# Patient Record
Sex: FEMALE | Race: WHITE | NOT HISPANIC OR LATINO | Employment: FULL TIME | URBAN - METROPOLITAN AREA
[De-identification: names, ages, dates, MRNs, and addresses within clinical notes are randomized per-mention and may not be internally consistent; named-entity substitution may affect disease eponyms.]

---

## 2017-01-24 ENCOUNTER — GENERIC CONVERSION - ENCOUNTER (OUTPATIENT)
Dept: OTHER | Facility: OTHER | Age: 57
End: 2017-01-24

## 2017-02-15 ENCOUNTER — TRANSCRIBE ORDERS (OUTPATIENT)
Dept: LAB | Facility: CLINIC | Age: 57
End: 2017-02-15

## 2017-02-15 ENCOUNTER — APPOINTMENT (OUTPATIENT)
Dept: LAB | Facility: CLINIC | Age: 57
End: 2017-02-15
Payer: COMMERCIAL

## 2017-02-15 DIAGNOSIS — N93.8 DYSFUNCTIONAL UTERINE BLEEDING: Primary | ICD-10-CM

## 2017-02-15 LAB — VENIPUNCTURE: NORMAL

## 2017-02-15 PROCEDURE — 36415 COLL VENOUS BLD VENIPUNCTURE: CPT

## 2017-04-18 ENCOUNTER — APPOINTMENT (OUTPATIENT)
Dept: LAB | Facility: CLINIC | Age: 57
End: 2017-04-18
Payer: COMMERCIAL

## 2017-04-18 ENCOUNTER — TRANSCRIBE ORDERS (OUTPATIENT)
Dept: LAB | Facility: CLINIC | Age: 57
End: 2017-04-18

## 2017-04-18 DIAGNOSIS — Z13.6 SCREENING FOR ISCHEMIC HEART DISEASE: Primary | ICD-10-CM

## 2017-04-18 LAB
BASOPHILS # BLD AUTO: 0.03 THOUSANDS/ΜL (ref 0–0.1)
BASOPHILS NFR BLD AUTO: 0 % (ref 0–1)
CREAT SERPL-MCNC: 0.52 MG/DL (ref 0.6–1.3)
EOSINOPHIL # BLD AUTO: 0.33 THOUSAND/ΜL (ref 0–0.61)
EOSINOPHIL NFR BLD AUTO: 5 % (ref 0–6)
ERYTHROCYTE [DISTWIDTH] IN BLOOD BY AUTOMATED COUNT: 13 % (ref 11.6–15.1)
GFR SERPL CREATININE-BSD FRML MDRD: >60 ML/MIN/1.73SQ M
HCT VFR BLD AUTO: 36.1 % (ref 34.8–46.1)
HGB BLD-MCNC: 11.7 G/DL (ref 11.5–15.4)
LYMPHOCYTES # BLD AUTO: 2.06 THOUSANDS/ΜL (ref 0.6–4.47)
LYMPHOCYTES NFR BLD AUTO: 29 % (ref 14–44)
MCH RBC QN AUTO: 29.6 PG (ref 26.8–34.3)
MCHC RBC AUTO-ENTMCNC: 32.4 G/DL (ref 31.4–37.4)
MCV RBC AUTO: 91 FL (ref 82–98)
MONOCYTES # BLD AUTO: 0.3 THOUSAND/ΜL (ref 0.17–1.22)
MONOCYTES NFR BLD AUTO: 4 % (ref 4–12)
NEUTROPHILS # BLD AUTO: 4.46 THOUSANDS/ΜL (ref 1.85–7.62)
NEUTS SEG NFR BLD AUTO: 62 % (ref 43–75)
NRBC BLD AUTO-RTO: 0 /100 WBCS
PLATELET # BLD AUTO: 266 THOUSANDS/UL (ref 149–390)
PMV BLD AUTO: 12 FL (ref 8.9–12.7)
POTASSIUM SERPL-SCNC: 3.8 MMOL/L (ref 3.5–5.3)
RBC # BLD AUTO: 3.95 MILLION/UL (ref 3.81–5.12)
WBC # BLD AUTO: 7.18 THOUSAND/UL (ref 4.31–10.16)

## 2017-04-18 PROCEDURE — 85025 COMPLETE CBC W/AUTO DIFF WBC: CPT

## 2017-04-18 PROCEDURE — 84132 ASSAY OF SERUM POTASSIUM: CPT

## 2017-04-18 PROCEDURE — 36415 COLL VENOUS BLD VENIPUNCTURE: CPT

## 2017-04-18 PROCEDURE — 82565 ASSAY OF CREATININE: CPT

## 2017-05-23 ENCOUNTER — TRANSCRIBE ORDERS (OUTPATIENT)
Dept: ADMINISTRATIVE | Facility: HOSPITAL | Age: 57
End: 2017-05-23

## 2017-05-23 ENCOUNTER — LAB (OUTPATIENT)
Dept: LAB | Facility: HOSPITAL | Age: 57
End: 2017-05-23
Payer: COMMERCIAL

## 2017-05-23 DIAGNOSIS — R50.82 FEVER POSTOP: Primary | ICD-10-CM

## 2017-05-23 DIAGNOSIS — R50.82 FEVER POSTOP: ICD-10-CM

## 2017-05-23 LAB
BACTERIA UR QL AUTO: ABNORMAL /HPF
BASOPHILS # BLD AUTO: 0 THOUSANDS/ΜL (ref 0–0.1)
BASOPHILS NFR BLD AUTO: 0 % (ref 0–1)
BILIRUB UR QL STRIP: NEGATIVE
CLARITY UR: ABNORMAL
COLOR UR: YELLOW
EOSINOPHIL # BLD AUTO: 0.4 THOUSAND/ΜL (ref 0–0.61)
EOSINOPHIL NFR BLD AUTO: 3 % (ref 0–6)
ERYTHROCYTE [DISTWIDTH] IN BLOOD BY AUTOMATED COUNT: 13.2 % (ref 11.6–15.1)
GLUCOSE UR STRIP-MCNC: NEGATIVE MG/DL
HCT VFR BLD AUTO: 37.5 % (ref 37–47)
HGB BLD-MCNC: 12.4 G/DL (ref 12–16)
HGB UR QL STRIP.AUTO: ABNORMAL
KETONES UR STRIP-MCNC: NEGATIVE MG/DL
LEUKOCYTE ESTERASE UR QL STRIP: ABNORMAL
LYMPHOCYTES # BLD AUTO: 1.7 THOUSANDS/ΜL (ref 0.6–4.47)
LYMPHOCYTES NFR BLD AUTO: 12 % (ref 14–44)
MCH RBC QN AUTO: 28.7 PG (ref 27–31)
MCHC RBC AUTO-ENTMCNC: 33.2 G/DL (ref 31.4–37.4)
MCV RBC AUTO: 87 FL (ref 82–98)
MONOCYTES # BLD AUTO: 0.5 THOUSAND/ΜL (ref 0.17–1.22)
MONOCYTES NFR BLD AUTO: 4 % (ref 4–12)
NEUTROPHILS # BLD AUTO: 11 THOUSANDS/ΜL (ref 1.85–7.62)
NEUTS SEG NFR BLD AUTO: 81 % (ref 43–75)
NITRITE UR QL STRIP: NEGATIVE
NON-SQ EPI CELLS URNS QL MICRO: ABNORMAL /HPF
NRBC BLD AUTO-RTO: 0 /100 WBCS
PH UR STRIP.AUTO: 5.5 [PH] (ref 5–9)
PLATELET # BLD AUTO: 252 THOUSANDS/UL (ref 130–400)
PMV BLD AUTO: 9.1 FL (ref 8.9–12.7)
PROT UR STRIP-MCNC: NEGATIVE MG/DL
RBC # BLD AUTO: 4.33 MILLION/UL (ref 4.2–5.4)
RBC #/AREA URNS AUTO: ABNORMAL /HPF
SP GR UR STRIP.AUTO: 1.01 (ref 1–1.03)
UROBILINOGEN UR QL STRIP.AUTO: 0.2 E.U./DL
WBC # BLD AUTO: 13.6 THOUSAND/UL (ref 4.8–10.8)
WBC #/AREA URNS AUTO: ABNORMAL /HPF

## 2017-05-23 PROCEDURE — 87086 URINE CULTURE/COLONY COUNT: CPT

## 2017-05-23 PROCEDURE — 85025 COMPLETE CBC W/AUTO DIFF WBC: CPT

## 2017-05-23 PROCEDURE — 81001 URINALYSIS AUTO W/SCOPE: CPT

## 2017-05-23 PROCEDURE — 36415 COLL VENOUS BLD VENIPUNCTURE: CPT

## 2017-05-24 LAB — BACTERIA UR CULT: NORMAL

## 2017-11-16 ENCOUNTER — ALLSCRIPTS OFFICE VISIT (OUTPATIENT)
Dept: OTHER | Facility: OTHER | Age: 57
End: 2017-11-16

## 2017-11-16 ENCOUNTER — GENERIC CONVERSION - ENCOUNTER (OUTPATIENT)
Dept: OTHER | Facility: OTHER | Age: 57
End: 2017-11-16

## 2017-11-30 ENCOUNTER — GENERIC CONVERSION - ENCOUNTER (OUTPATIENT)
Dept: OTHER | Facility: OTHER | Age: 57
End: 2017-11-30

## 2017-11-30 ENCOUNTER — ALLSCRIPTS OFFICE VISIT (OUTPATIENT)
Dept: OTHER | Facility: OTHER | Age: 57
End: 2017-11-30

## 2018-01-09 NOTE — MISCELLANEOUS
Message   Recorded as Task   Date: 01/24/2017 09:38 AM, Created By: Mitchel Sales   Task Name: Med Renewal Request   Assigned To: Claudeen Mow   Regarding Patient: Soheila Tuttle, Status: Active   CommentRudotawnya Humphreys - 24 Jan 2017 9:38 AM     TASK CREATED  Caller: Self; Renew Medication; [phone] (Home); [phone] (Work)  We haven't seen the patient since last January  I scheduled an appointment for her for 2/8 but she wants to know if we can give her a refill for Tamoxifen  She indicated she has been out for about a week  I informed her that I didn't know if we could refill the med until we saw her again  I told her I would call if we could refill it  Ann Gonzalez - 24 Jan 2017 10:01 AM     TASK EDITED  rx refilled  pt will have completed 10 years of therapy in June 2017  left message for patient  f/u apt scheduled for pt  Active Problems    1  Breast cancer (174 9) (C50 919)   2  Malignant neoplasm of breast, unspecified laterality    Current Meds   1  Calcium + D TABS; Therapy: (Recorded:28Wfe2181) to Recorded   2  Green Tea CAPS; Therapy: (Coni Memos) to Recorded   3  Multi-Vitamin Oral Tablet; Therapy: (Recorded:59Now3189) to Recorded   4  Omega 3 CAPS; Therapy: (Coni Memos) to Recorded   5  Tamoxifen Citrate 20 MG Oral Tablet; Take 1 tablet daily; Therapy: 30CEI5848 to (Evaluate:50Krf1335)  Requested for: 22EVT6515; Last   Rx:05Jan2016 Ordered   6  Vitamin C CAPS; Therapy: (Coni Memos) to Recorded    Allergies    1  No Known Drug Allergies    Plan  Malignant neoplasm of breast, unspecified laterality    · Tamoxifen Citrate 20 MG Oral Tablet;  Take 1 tablet daily    Signatures   Electronically signed by : Patricia Gonzalez RN; Jan 24 2017 10:04AM EST                       (Author)

## 2018-01-22 VITALS
HEIGHT: 63 IN | DIASTOLIC BLOOD PRESSURE: 78 MMHG | SYSTOLIC BLOOD PRESSURE: 120 MMHG | WEIGHT: 173 LBS | BODY MASS INDEX: 30.65 KG/M2

## 2018-01-22 VITALS
DIASTOLIC BLOOD PRESSURE: 82 MMHG | SYSTOLIC BLOOD PRESSURE: 140 MMHG | WEIGHT: 173 LBS | HEIGHT: 63 IN | BODY MASS INDEX: 30.65 KG/M2

## 2018-03-07 NOTE — CONSULTS
Plan    1  Flonase Sensimist 27 5 MCG/SPRAY Nasal Suspension; USE 2 SPRAYS IN EACH   NOSTRIL ONCE DAILY    2  Removal Impacted Cerumen Requiring Instrumentation one or both ears - POC;   Status:Complete;   Done: 10PFZ8225    Assessment    1  Apnea, sleep (780 57) (G47 30)   2  History of Hysterectomy   3  History of Breast Surgery Mastectomy   4  History of Tonsillectomy   5     6  Has 3 children   7  Never a smoker   8  Rarely consumes alcohol (V49 89) (Z78 9)   9  Family history of malignant neoplasm of prostate (V16 42) (R76 91) : Father   10  Family history of hypertension (V17 49) (Z82 49) : Family History   11  Family history of Diverticulosis : Family History   12  History of malignant neoplasm (V10 90) (Z85 9)   13  Hypertrophy of inferior nasal turbinate (478 0) (J34 3)   14  Impacted cerumen of left ear (380 4) (T40 81)    Chief Complaint  Chief Complaint Free Text Note Form: Joseph Salgado is here for sleep apnea      History of Present Illness  HPI: She presents with concern for sleep apnea  She was tested for ERI, reports AHI 5 1  Tried CPAP but could not tolerate either the nasal or the full face mask  She wakes up frequently at night  Not sure why  Feels night time anxiety, mouth breathing/needs water in the night  She has tried OTC "PM" medications for this  She tried 5 mg Ambien without improvement  She denies nasal obstruction but is a night time mouth breather  She has tried a chin strap without help  Of note, she has gained 5 lbs over the past several months  Gained 30 lbs over years, 10 lbs since May  Review of Systems  Complete ENT ROS [facility]:   Eyes: No complaints of itching, excessive tearing or vision changes  Ears: No complaints of hearing loss, discharge, imbalance, recent ear infections, or tinnitus  Nose: No nasal obstruction, no discharge or runniness, no bleeding, no dryness, no sneezing and no loss of smell     Mouth: No sores in mouth, no altered taste, no dental problems  Throat: No complaints of throat pain, no difficulty swallowing, no hoarseness  Neck: No neck soreness, no neck pain, no neck lumps or swelling  Genitourinary: No complaints of dysuria, flank pain or frequent urination  Cardiovascular: No complaints of chest pain or palpitations  Respiratory: No complaints of shortness of breath, cough or wheezing  Gastrointestinal: No complaints of heartburn, nausea/vomiting, or constipation  Neurological: No complaints of headache, convulsions or memory loss  ROS Reviewed:   ROS reviewed  Active Problems    1  Breast cancer (174 9) (C50 349)   2  Malignant neoplasm of breast, unspecified laterality    Surgical History    1  History of Breast Surgery Mastectomy   2  History of Tonsillectomy    Family History  Mother    1  Family history of Hypertension (V17 49)  Father    2  Family history of Hypertension (V17 49)   3  Family history of Urinary Calculus  Paternal Grandmother    4  Family history of Carcinoma Of The Ovary (V16 41)  Family History    5  Family history of Hypertension (V17 49)    Social History    · Being A Social Drinker   · Marital History -  (V61 03)   · Never A Smoker    Current Meds   1  Calcium + D TABS; Therapy: (Recorded:03Cdt0870) to Recorded   2  Green Tea CAPS; Therapy: (Ferna Mari) to Recorded   3  Multi-Vitamin Oral Tablet; Therapy: (Recorded:04Pnn8269) to Recorded   4  Rush 3 CAPS; Therapy: (Ferna Mari) to Recorded   5  Vitamin C CAPS; Therapy: (Ferna Mari) to Recorded    Allergies    1  No Known Drug Allergies    Vitals  Signs   Recorded: 12PRV5513 66:01WD   Systolic: 394  Diastolic: 82  Height: 5 ft 3 in  Weight: 173 lb   BMI Calculated: 30 65  BSA Calculated: 1 82    Physical Exam  Constitutional:  Well developed, well nourished and groomed, in no acute distress    Eyes:  Extra-ocular movements intact, pupils equally round and reactive to light and accommodation, the lids and conjunctivae are normal in appearance  HEENT:    Head: Atraumatic, normocephalic, no visible scalp lesions, bony palpation unremarkable without stepoffs, parotid and submandibular salivary glands non-tender to palpation and without masses bilaterally  Ears:  Auricles normal in appearance bilaterally, mastoid prominence non-tender, external auditory canal clear right with normal TM; obstructed with hard cerumen left side  Partial disimpaction performed using the operating otoscope, otologic suction and currette and alligator forceps  Nose/Sinuses:  External appearance unremarkable, no maxillary or frontal sinus tenderness to palpation bilaterally, anterior rhinoscopy reveals narrow nasal vault, edematous appearing mucosa, without polyps or masses  Enlarged inferior turbinates  Oral Cavity:  Moist mucus membranes, gums dentition unremarkable, no oral mucosal masses or lesions, floor of mouth soft, tongue mobile without masses or lesions  Oropharynx:  Base of tongue soft and without masses, tonsils bilaterally unremarkable, soft palate mucosa unremarkable, laryngeal mirror exam unrevealing  Neck:  No visible or palpable cervical lesions or lymphadenopathy, thyroid gland is normal in size and symmetry and without masses, normal laryngeal elevation with swallowing  Cardiovascular:  Normal rate and rhythm, no palpable thrills, no jugulovenous distension observed  Respiratory:  Normal respiratory effort without evidence of retractions or use of accessory muscles  Integument:  Normal appearing without observed masses or lesions  Neurologic:  Cranial nerves II-XII intact bilaterally  Psychiatric:  Alert and oriented to time, place and person, normal affect  Discussion/Summary  Discussion Summary:   She has very mild ERI and is not a candidate for sleep apnea surgery  Her chronic mouth breathing is due to anatomical narrowing of her nose without septal deviation   I placed her on Flonase for her turbinate hypertrophy, which should aid in her nasal breathing  Surgery would involve a functional rhinoplasty  She has a left sided cerumen impaction that I partially debrided  She will start debrox and follow up in 2 weeks for further disimpaction        Signatures   Electronically signed by : Zara Pallas, M D ; Nov 16 2017  8:57AM EST                       (Author)

## 2018-03-07 NOTE — PROGRESS NOTES
Assessment    1  Impacted cerumen of left ear (380 4) (X65 50)    Chief Complaint  Chief Complaint Free Text Note Form: Naren Gonzalez is here today for c/o left ear wax build-up      Active Problems    1  Apnea, sleep (780 57) (G47 30)   2  Breast cancer (174 9) (C50 919)   3  Hypertrophy of inferior nasal turbinate (478 0) (J34 3)   4  Impacted cerumen of left ear (380 4) (H61 22)   5  Malignant neoplasm of breast, unspecified laterality    Past Medical History    1  History of malignant neoplasm (V10 90) (Z85 9)    Surgical History    1  History of Breast Surgery Mastectomy   2  History of Hysterectomy   3  History of Tonsillectomy    Family History  Mother    1  Family history of Hypertension (V17 49)  Father    2  Family history of malignant neoplasm of prostate (V16 42) (Z80 42)   3  Family history of Hypertension (V17 49)   4  Family history of Urinary Calculus  Paternal Grandmother    5  Family history of Carcinoma Of The Ovary (V16 41)  Family History    6  Family history of Diverticulosis   7  Family history of hypertension (V17 49) (Z82 49)   8  Family history of Hypertension (V17 49)    Social History    · Being A Social Drinker   ·    · Has 3 children   · Marital History -  (V61 03)   · Never a smoker   · Never A Smoker   · Rarely consumes alcohol (V49 89) (Z78 9)    Current Meds   1  Flonase Sensimist 27 5 MCG/SPRAY Nasal Suspension; USE 2 SPRAYS IN EACH   NOSTRIL ONCE DAILY; Therapy: 09QZU5705 to (Last Rx:16Nov2017)  Requested for: 32FUH3651 Ordered   2  Glucosamine Chondr Complex 500-400 MG Oral Capsule; Therapy: (Recorded:30Nov2017) to Recorded   3  Multi-Vitamin Oral Tablet; Therapy: (Recorded:37Ael1922) to Recorded    Allergies    1   No Known Drug Allergies    Vitals  Signs   Recorded: 65SZG6401 22:46AS   Systolic: 486, RUE, Sitting  Diastolic: 78, RUE, Sitting  Height: 5 ft 3 in  Weight: 173 lb   BMI Calculated: 30 65  BSA Calculated: 1 82    Discussion/Summary  Discussion Summary: Unable to dislodge additional cerumen with available equipment in this office  THis is a no charge visit  She will follow up in [place] for a more involved cleaning        Signatures   Electronically signed by : LESLY Patel ; Nov 30 2017  8:49AM EST                       (Author)

## 2020-08-17 ENCOUNTER — OFFICE VISIT (OUTPATIENT)
Dept: URGENT CARE | Facility: CLINIC | Age: 60
End: 2020-08-17
Payer: COMMERCIAL

## 2020-08-17 VITALS
WEIGHT: 135.6 LBS | BODY MASS INDEX: 24.95 KG/M2 | OXYGEN SATURATION: 100 % | HEART RATE: 92 BPM | RESPIRATION RATE: 18 BRPM | DIASTOLIC BLOOD PRESSURE: 80 MMHG | SYSTOLIC BLOOD PRESSURE: 130 MMHG | TEMPERATURE: 98.4 F | HEIGHT: 62 IN

## 2020-08-17 DIAGNOSIS — L03.011 PARONYCHIA OF RIGHT MIDDLE FINGER: Primary | ICD-10-CM

## 2020-08-17 PROCEDURE — 99213 OFFICE O/P EST LOW 20 MIN: CPT | Performed by: PHYSICIAN ASSISTANT

## 2020-08-17 PROCEDURE — 26010 DRAINAGE OF FINGER ABSCESS: CPT | Performed by: PHYSICIAN ASSISTANT

## 2020-08-17 RX ORDER — CEPHALEXIN 500 MG/1
500 CAPSULE ORAL EVERY 6 HOURS SCHEDULED
COMMUNITY

## 2020-08-17 RX ORDER — DIPHENOXYLATE HYDROCHLORIDE AND ATROPINE SULFATE 2.5; .025 MG/1; MG/1
TABLET ORAL
COMMUNITY

## 2020-08-17 NOTE — LETTER
August 17, 2020     Patient: Chris Coats   YOB: 1960   Date of Visit: 8/17/2020       To Whom It May Concern: It is my medical opinion that Vera Carey may return to work on 8/19/2020 or sooner if feeling better  If you have any questions or concerns, please don't hesitate to call           Sincerely,        Larisa Goodrich PA-C

## 2020-08-17 NOTE — PATIENT INSTRUCTIONS
Complete the antibiotic as directed with food and water  Take a probiotic while taking this medication  Apply a warm compress or soak the area for 15-20 minutes at a time, 2-3 times daily  After soaking, milk your finger going away from your body  Keep the area clean washing with soap and water  Pat dry  Monitor for signs of worsening infection including increasing redness, streaking redness, persistent pus formation, fevers, chills, and sweats  Seek care immediately if these symptoms occur  Follow up with your family doctor in 3-5 days  Proceed to the ER if symptoms worsen

## 2020-08-17 NOTE — PROGRESS NOTES
[facility]        NAME: Karin Victoria is a 62 y o  female  : 1960    MRN: [id number]  DATE: 2020  TIME: 11:21 AM    Assessment and Plan   Paronychia of right middle finger [L03 011]  1  Paronychia of right middle finger       Patient Instructions   Complete the antibiotic as directed with food and water  Take a probiotic while taking this medication  Apply a warm compress or soak the area for 15-20 minutes at a time, 2-3 times daily  After soaking, milk your finger going away from your body  Keep the area clean washing with soap and water  Pat dry  Monitor for signs of worsening infection including increasing redness, streaking redness, persistent pus formation, fevers, chills, and sweats  Seek care immediately if these symptoms occur  Follow up with your family doctor in 3-5 days  Proceed to the ER if symptoms worsen  Chief Complaint     Chief Complaint   Patient presents with    Insect Bite     Pobable bite by spider after swiping away cobwebs on Thursday  By  - R 3rd finger at distal end became red  Worse swelling by Marisol Rosado - had video visiy and Rx'd Cephalexin 500 mg QID x 19 days  Today worse - grey arae at nail bed - medial side  No drainage/fever  History of Present Illness     41-year-old female presenting with c/o spider bite of the left middle finger x 5 days  States she felt a small prick on the finger when dusting a cobweb and believes she was bit by a spider  Reports a small black dot on the pad of the finger tip to follow this that progressed to surrounding swelling and redness now extending to the D IP joint  Describes a throbbing discomfort  Was seen for an E visit yesterday afternoon and started on Keflex 500 milligrams q i d  And has completed 5 doses of this  This morning awoke with white discoloration along the nail bed and is concerned she may need to have the finger drained    She denies any fever, chills, sweats, other body aches or joint aches, abdominal pains, nausea, vomiting, or diarrhea  No other treatments tried  No prior occurrence  Review of Systems   Review of Systems   Constitutional: Negative for chills, diaphoresis and fever  Respiratory: Negative for cough  Cardiovascular: Negative for chest pain and palpitations  Gastrointestinal: Negative for abdominal pain, diarrhea, nausea and vomiting  Musculoskeletal: Negative for myalgias  Skin: Positive for color change  Negative for wound  Neurological: Negative for dizziness, weakness, numbness and headaches  Current Medications       Current Outpatient Medications:     cephalexin (KEFLEX) 500 mg capsule, Take 500 mg by mouth every 6 (six) hours, Disp: , Rfl:     multivitamin (THERAGRAN) TABS, Take by mouth, Disp: , Rfl:     Current Allergies     Allergies as of 08/17/2020    (No Known Allergies)            The following portions of the patient's history were reviewed and updated as appropriate: allergies, current medications, past family history, past medical history, past social history, past surgical history and problem list      Past Medical History:   Diagnosis Date    Cancer ([facility] [address] )     L breast cancer       Past Surgical History:   Procedure Laterality Date    ADENOIDECTOMY      BREAST SURGERY Left     MRM    TONSILLECTOMY         History reviewed  No pertinent family history  Medications have been verified  Objective   /80   Pulse 92   Temp 98 4 °F (36 9 °C)   Resp 18   Ht 5' 2" (1 575 m)   Wt 61 5 kg (135 lb 9 6 oz)   SpO2 100%   BMI 24 80 kg/m²       Physical Exam     Physical Exam  Vitals signs and nursing note reviewed  Constitutional:       General: She is not in acute distress  Appearance: She is well-developed  She is not ill-appearing or diaphoretic  HENT:      Head: Normocephalic and atraumatic  Eyes:      General: Lids are normal          Right eye: No discharge  Left eye: No discharge  Conjunctiva/sclera: Conjunctivae normal       Right eye: Right conjunctiva is not injected  No chemosis or exudate  Left eye: Left conjunctiva is not injected  No chemosis or exudate  Cardiovascular:      Rate and Rhythm: Normal rate and regular rhythm  Heart sounds: Normal heart sounds  Heart sounds not distant  No murmur  No friction rub  No gallop  Pulmonary:      Effort: Pulmonary effort is normal  No respiratory distress  Breath sounds: Normal breath sounds  No stridor  No decreased breath sounds, wheezing, rhonchi or rales  Skin:     General: Skin is warm and dry  Coloration: Skin is not pale  Findings: No erythema or rash  Comments: Edema, warmth, and erythema of the right 3rd digit of hand  There is a small amount of pus formation along the lateral aspect of the nail border that is fluctuant  No streaking erythema  Digit is TTP with range of motion intact  Distal sensation and perfusion intact  Neurological:      Mental Status: She is alert  She is not disoriented  Cranial Nerves: No cranial nerve deficit  Coordination: Coordination normal       Gait: Gait normal    Psychiatric:         Behavior: Behavior normal  Behavior is cooperative  Thought Content:  Thought content normal          Judgment: Judgment normal          Incision and drain    Date/Time: 8/17/2020 11:28 AM  Performed by: Layla Pop PA-C  Authorized by: Layla Pop PA-C     Patient location:  Clinic  Other Assisting Provider: No    Consent:     Consent obtained:  Verbal    Consent given by:  Patient    Risks discussed:  Bleeding, damage to other organs, infection, incomplete drainage and pain    Alternatives discussed:  No treatment and delayed treatment  Universal protocol:     Procedure explained and questions answered to patient or proxy's satisfaction: yes      Relevant documents present and verified: yes      Patient identity confirmed:  Verbally with patient  Location: Type:  Abscess    Location:  Upper extremity    Upper extremity location:  R long finger  Pre-procedure details:     Skin preparation:  Betadine  Anesthesia (see MAR for exact dosages): Anesthesia method:  None  Procedure details:     Complexity:  Simple    Incision types:  Stab incision    Scalpel blade:  11    Approach:  Open    Incision depth:  Superficial    Wound management:  Irrigated with saline    Drainage:  Purulent    Drainage amount:  Scant    Wound treatment:  Wound left open    Packing materials:  None  Post-procedure details:     Patient tolerance of procedure:   Tolerated well, no immediate complications

## 2022-09-19 ENCOUNTER — OFFICE VISIT (OUTPATIENT)
Dept: URGENT CARE | Facility: CLINIC | Age: 62
End: 2022-09-19
Payer: COMMERCIAL

## 2022-09-19 VITALS
RESPIRATION RATE: 18 BRPM | TEMPERATURE: 98 F | SYSTOLIC BLOOD PRESSURE: 144 MMHG | HEART RATE: 88 BPM | DIASTOLIC BLOOD PRESSURE: 81 MMHG | OXYGEN SATURATION: 99 %

## 2022-09-19 DIAGNOSIS — H66.92 LEFT OTITIS MEDIA, UNSPECIFIED OTITIS MEDIA TYPE: Primary | ICD-10-CM

## 2022-09-19 PROCEDURE — 99213 OFFICE O/P EST LOW 20 MIN: CPT | Performed by: STUDENT IN AN ORGANIZED HEALTH CARE EDUCATION/TRAINING PROGRAM

## 2022-09-19 RX ORDER — AMOXICILLIN AND CLAVULANATE POTASSIUM 875; 125 MG/1; MG/1
1 TABLET, FILM COATED ORAL EVERY 12 HOURS SCHEDULED
Qty: 14 TABLET | Refills: 0 | Status: SHIPPED | OUTPATIENT
Start: 2022-09-19 | End: 2022-09-26

## 2022-09-19 RX ORDER — FLUTICASONE PROPIONATE 50 MCG
2 SPRAY, SUSPENSION (ML) NASAL DAILY
Qty: 11.1 ML | Refills: 1 | Status: SHIPPED | OUTPATIENT
Start: 2022-09-19

## 2022-09-19 NOTE — PROGRESS NOTES
[facility]        NAME: Aletha Bardales is a 65 y o  female  : 1960    MRN: [id number]  DATE: 2022  TIME: 11:31 AM    Assessment and Plan   Left otitis media, unspecified otitis media type [H66 92]  1  Left otitis media, unspecified otitis media type  amoxicillin-clavulanate (AUGMENTIN) 875-125 mg per tablet    fluticasone (FLONASE) 50 mcg/act nasal spray         Patient Instructions       Follow up with PCP in 3-5 days  Proceed to  ER if symptoms worsen  Chief Complaint     Chief Complaint   Patient presents with    Ear Fullness      X a few weeks, with use of OTC debrox, without releif, also c/o sinus pressure          History of Present Illness       HPI   Patient presents today complaining increased sinus pain and pressure left-sided ear pain ongoing for the past 1 week  Patient does not have any fevers or chills, she has been using Debrox    Patient does not have any chest pain shortness of breath    Review of Systems   Review of Systems  Per hpi     Current Medications       Current Outpatient Medications:     amoxicillin-clavulanate (AUGMENTIN) 875-125 mg per tablet, Take 1 tablet by mouth every 12 (twelve) hours for 7 days, Disp: 14 tablet, Rfl: 0    fluticasone (FLONASE) 50 mcg/act nasal spray, 2 sprays into each nostril daily, Disp: 11 1 mL, Rfl: 1    cephalexin (KEFLEX) 500 mg capsule, Take 500 mg by mouth every 6 (six) hours (Patient not taking: Reported on 2022), Disp: , Rfl:     multivitamin (THERAGRAN) TABS, Take by mouth, Disp: , Rfl:     Current Allergies     Allergies as of 2022    (No Known Allergies)            The following portions of the patient's history were reviewed and updated as appropriate: allergies, current medications, past family history, past medical history, past social history, past surgical history and problem list      Past Medical History:   Diagnosis Date    Cancer ([facility] [address] )     L breast cancer       Past Surgical History: Procedure Laterality Date    ADENOIDECTOMY      BREAST SURGERY Left     MRM    TONSILLECTOMY         No family history on file  Medications have been verified  Objective   /81   Pulse 88   Temp 98 °F (36 7 °C)   Resp 18   SpO2 99%   No LMP recorded  Patient has had a hysterectomy  Physical Exam     Physical Exam  Constitutional:       General: She is not in acute distress  Appearance: Normal appearance  HENT:      Head: Normocephalic  Right Ear: Tympanic membrane normal       Ears:      Comments: Erythema left tympanic membrane     Nose: No congestion or rhinorrhea  Mouth/Throat:      Mouth: Mucous membranes are moist       Pharynx: No oropharyngeal exudate or posterior oropharyngeal erythema  Eyes:      General:         Right eye: No discharge  Left eye: No discharge  Conjunctiva/sclera: Conjunctivae normal    Cardiovascular:      Rate and Rhythm: Normal rate and regular rhythm  Pulses: Normal pulses  Pulmonary:      Effort: Pulmonary effort is normal  No respiratory distress  Abdominal:      General: Abdomen is flat  There is no distension  Palpations: Abdomen is soft  Tenderness: There is no abdominal tenderness  Musculoskeletal:      Cervical back: Neck supple  Skin:     General: Skin is warm  Capillary Refill: Capillary refill takes less than 2 seconds  Neurological:      Mental Status: She is alert and oriented to person, place, and time

## 2022-10-24 ENCOUNTER — OFFICE VISIT (OUTPATIENT)
Dept: OTOLARYNGOLOGY | Facility: CLINIC | Age: 62
End: 2022-10-24
Payer: COMMERCIAL

## 2022-10-24 VITALS — HEIGHT: 62 IN | BODY MASS INDEX: 27.6 KG/M2 | TEMPERATURE: 97.3 F | WEIGHT: 150 LBS

## 2022-10-24 DIAGNOSIS — H61.22 LEFT EAR IMPACTED CERUMEN: ICD-10-CM

## 2022-10-24 DIAGNOSIS — H69.82 ETD (EUSTACHIAN TUBE DYSFUNCTION), LEFT: Primary | ICD-10-CM

## 2022-10-24 PROBLEM — H69.92 ETD (EUSTACHIAN TUBE DYSFUNCTION), LEFT: Status: ACTIVE | Noted: 2022-10-24

## 2022-10-24 PROCEDURE — 99204 OFFICE O/P NEW MOD 45 MIN: CPT | Performed by: NURSE PRACTITIONER

## 2022-10-24 PROCEDURE — 69210 REMOVE IMPACTED EAR WAX UNI: CPT | Performed by: NURSE PRACTITIONER

## 2022-10-24 RX ORDER — PREDNISONE 10 MG/1
10 TABLET ORAL 2 TIMES DAILY
COMMUNITY
Start: 2022-09-26

## 2022-10-24 RX ORDER — OFLOXACIN 3 MG/ML
4 SOLUTION AURICULAR (OTIC) 2 TIMES DAILY
Qty: 10 ML | Refills: 0 | Status: SHIPPED | OUTPATIENT
Start: 2022-10-24 | End: 2022-10-31

## 2022-10-24 NOTE — PROGRESS NOTES
Assessment/Plan:    Left ear impacted cerumen    On exam noted left cerumen impaction and unable to fully view tympanic membrane  Right EAC clear  Prior to cerumen removal noted abrasion on lateral left eac  Cerumen impaction removed left eac with alligator forceps and suction, pt tolerated procedure well  Upon removal, improved hearing and decreased clogged sensation of bilateral ears  Discussed routine cerumen care including avoidance of q-tips and cerumen softeners  Hydrocortisone cream pea sized amount on finger as needed for itching in ears  Ear drops for the abrasion left eac for 7 days  Encourage ongoing follow up prn to monitor for cerumen and hearing  Audiogram if symptoms worsen  ETD (Eustachian tube dysfunction), left  After cerumen removal noted left TM with retraction, no erythema, no serous fluid  May use Fluticasone nasal spray for ETD  If no change in hearing then consider audiogram          Diagnoses and all orders for this visit:    ETD (Eustachian tube dysfunction), left  -     Ambulatory referral to Audiology    Left ear impacted cerumen  -     ofloxacin (FLOXIN) 0 3 % otic solution; Administer 4 drops into the left ear 2 (two) times a day for 7 days  -     Ear cerumen removal    Other orders  -     predniSONE 10 mg tablet; Take 10 mg by mouth 2 (two) times a day (Patient not taking: Reported on 10/24/2022)          Subjective:      Patient ID: Al Miranda is a 65 y o  female  Presents today as a new patient due to ear concerns  Congestion in ears for past month and half  Tried OTC Debrox and had minimal drainage  Treated in urgent care with antibiotics and Flonase  Then flew and developed ear pain, left more than right  Then treated with oral steroids, ear drops and another antibiotic  History cerumen removal over 5 years ago          The following portions of the patient's history were reviewed and updated as appropriate: allergies, current medications, past family history, past medical history, past social history, past surgical history and problem list     Review of Systems   Constitutional: Negative  HENT: Negative for congestion, ear discharge, ear pain, hearing loss, nosebleeds, postnasal drip, rhinorrhea, sinus pressure, sinus pain, sore throat, tinnitus and voice change  Eyes: Negative  Respiratory: Negative for chest tightness and shortness of breath  Cardiovascular: Negative  Gastrointestinal: Negative  Endocrine: Negative  Musculoskeletal: Negative  Skin: Negative for color change  Neurological: Negative for dizziness, numbness and headaches  Psychiatric/Behavioral: Negative  Objective:      Temp (!) 97 3 °F (36 3 °C) (Temporal)   Ht 5' 2" (1 575 m)   Wt 68 kg (150 lb)   BMI 27 44 kg/m²            Physical Exam  Constitutional:       Appearance: She is well-developed  HENT:      Head: Normocephalic  Right Ear: Hearing, tympanic membrane, ear canal and external ear normal  No decreased hearing noted  No drainage or tenderness  Tympanic membrane is not perforated or erythematous  Left Ear: Hearing, tympanic membrane, ear canal and external ear normal  No decreased hearing noted  No drainage or tenderness  There is impacted cerumen  Tympanic membrane is not perforated or erythematous  Nose: Nose normal  No nasal deformity or septal deviation  Mouth/Throat:      Mouth: Mucous membranes are not pale and not dry  No oral lesions  Dentition: Normal dentition  Pharynx: Uvula midline  No oropharyngeal exudate  Neck:      Trachea: No tracheal deviation  Cardiovascular:      Rate and Rhythm: Normal rate  Pulmonary:      Effort: Pulmonary effort is normal  No accessory muscle usage or respiratory distress  Musculoskeletal:      Right shoulder: Normal range of motion  Cervical back: Full passive range of motion without pain, normal range of motion and neck supple     Lymphadenopathy: Cervical: No cervical adenopathy  Skin:     General: Skin is warm and dry  Neurological:      Mental Status: She is alert and oriented to person, place, and time  Cranial Nerves: No cranial nerve deficit  Sensory: No sensory deficit  Psychiatric:         Behavior: Behavior is cooperative  Ear cerumen removal    Date/Time: 10/24/2022 1:45 PM  Performed by: Raymondo Prader, CRNP  Authorized by: Raymondo Prader, CRNP   Universal Protocol:  Consent: Verbal consent obtained  Risks and benefits: risks, benefits and alternatives were discussed  Consent given by: patient  Patient understanding: patient states understanding of the procedure being performed      Patient location:  Clinic  Procedure details:     Local anesthetic:  None    Location:  L ear    Approach:  External  Post-procedure details:     Complication:  None    Hearing quality:  Normal    Patient tolerance of procedure:   Tolerated well, no immediate complications

## 2022-10-24 NOTE — ASSESSMENT & PLAN NOTE
After cerumen removal noted left TM with retraction, no erythema, no serous fluid  May use Fluticasone nasal spray for ETD    If no change in hearing then consider audiogram

## 2022-10-24 NOTE — PATIENT INSTRUCTIONS
Fluticasone nasal spray (Flonase) one spray each nostril twice per day    Ear drops as prescribed    Hearing test if no change in one to two weeks    Avoid q-tips    Itching ears - Hydrocortisone cream to both ears at bedtime as needed for itching

## 2022-10-24 NOTE — ASSESSMENT & PLAN NOTE
On exam noted left cerumen impaction and unable to fully view tympanic membrane  Right EAC clear  Prior to cerumen removal noted abrasion on lateral left eac  Cerumen impaction removed left eac with alligator forceps and suction, pt tolerated procedure well  Upon removal, improved hearing and decreased clogged sensation of bilateral ears  Discussed routine cerumen care including avoidance of q-tips and cerumen softeners  Hydrocortisone cream pea sized amount on finger as needed for itching in ears  Ear drops for the abrasion left eac for 7 days  Encourage ongoing follow up prn to monitor for cerumen and hearing  Audiogram if symptoms worsen

## 2022-12-23 PROBLEM — H61.22 LEFT EAR IMPACTED CERUMEN: Status: RESOLVED | Noted: 2022-10-24 | Resolved: 2022-12-23

## 2024-05-24 ENCOUNTER — OFFICE VISIT (OUTPATIENT)
Dept: OBGYN CLINIC | Facility: CLINIC | Age: 64
End: 2024-05-24
Payer: COMMERCIAL

## 2024-05-24 ENCOUNTER — APPOINTMENT (OUTPATIENT)
Dept: RADIOLOGY | Facility: CLINIC | Age: 64
End: 2024-05-24
Payer: COMMERCIAL

## 2024-05-24 VITALS
DIASTOLIC BLOOD PRESSURE: 86 MMHG | SYSTOLIC BLOOD PRESSURE: 158 MMHG | WEIGHT: 149.2 LBS | HEART RATE: 110 BPM | HEIGHT: 62 IN | BODY MASS INDEX: 27.46 KG/M2

## 2024-05-24 DIAGNOSIS — G89.29 CHRONIC PAIN OF BOTH KNEES: ICD-10-CM

## 2024-05-24 DIAGNOSIS — M25.562 PAIN IN BOTH KNEES, UNSPECIFIED CHRONICITY: ICD-10-CM

## 2024-05-24 DIAGNOSIS — M25.562 CHRONIC PAIN OF BOTH KNEES: ICD-10-CM

## 2024-05-24 DIAGNOSIS — M17.0 PRIMARY OSTEOARTHRITIS OF BOTH KNEES: Primary | ICD-10-CM

## 2024-05-24 DIAGNOSIS — M25.561 PAIN IN BOTH KNEES, UNSPECIFIED CHRONICITY: ICD-10-CM

## 2024-05-24 DIAGNOSIS — M25.561 CHRONIC PAIN OF BOTH KNEES: ICD-10-CM

## 2024-05-24 PROCEDURE — 99214 OFFICE O/P EST MOD 30 MIN: CPT | Performed by: ORTHOPAEDIC SURGERY

## 2024-05-24 PROCEDURE — 73562 X-RAY EXAM OF KNEE 3: CPT

## 2024-05-24 NOTE — PROGRESS NOTES
Assessment/Plan:  1. Primary osteoarthritis of both knees  Ambulatory Referral to Physical Therapy      2. Chronic pain of both knees  XR knee 3 vw right non injury    XR knee 3 vw left non injury    Ambulatory Referral to Physical Therapy        Scribe Attestation      I,:  Mic Cole am acting as a scribe while in the presence of the attending physician.:       I,:  Jayesh Ye, DO personally performed the services described in this documentation    as scribed in my presence.:           Trish is a pleasant 64-year-old female presents today for initial evaluation of her bilateral knee pain.  After reviewing her imaging and performing a thorough history and physical exam, I explained that she is symptomatic of her mild localized underlying osteoarthritis.  I recommended that she initiate physical therapy in order to optimize her patellofemoral mechanics and function.  I provided her with a referral for this today.  I also encouraged her to continue using Tylenol and other over-the-counter topical medications as needed.  When her symptoms no longer respond to these interventions, she will return to the office for follow-up evaluation.  All of her questions and concerns were addressed today.    Subjective: Initial evaluation for bilateral knee pain    Patient ID: Trish Gonzalez is a 64 y.o. female who presents today for initial evaluation of her bilateral knee pain.  At today's visit, she reports that her right knee symptoms began approximately 18 months ago but have been increasing in frequency and intensity after a recent trip to [place].  She describes intermittent activity related discomfort about the anterior knee.  Her symptoms are more pronounced on the right over the left.  She finds this particularly pronounced when navigating stairs.  She recently started using Tylenol and Biofreeze which is providing some benefit for her.  She denies any recent injury or trauma.    Review of Systems   Constitutional:   Positive for activity change. Negative for chills, fever and unexpected weight change.   HENT:  Negative for hearing loss, nosebleeds and sore throat.    Eyes:  Negative for pain, redness and visual disturbance.   Respiratory:  Negative for cough, shortness of breath and wheezing.    Cardiovascular:  Negative for chest pain, palpitations and leg swelling.   Gastrointestinal:  Negative for abdominal pain, nausea and vomiting.   Endocrine: Negative for polydipsia and polyuria.   Genitourinary:  Negative for dysuria and hematuria.   Musculoskeletal:  Positive for arthralgias and myalgias. Negative for joint swelling.        See HPI   Skin:  Negative for rash and wound.   Neurological:  Negative for dizziness, numbness and headaches.   Psychiatric/Behavioral:  Negative for decreased concentration and suicidal ideas. The patient is not nervous/anxious.          Past Medical History:   Diagnosis Date    Cancer (HCC)     L breast cancer    Sleep difficulties        Past Surgical History:   Procedure Laterality Date    ADENOIDECTOMY      BREAST SURGERY Left     MRM    TONSILLECTOMY         History reviewed. No pertinent family history.    Social History     Occupational History    Not on file   Tobacco Use    Smoking status: Never    Smokeless tobacco: Never   Substance and Sexual Activity    Alcohol use: Yes     Comment: rare    Drug use: Never    Sexual activity: Not Currently     Partners: Female         Current Outpatient Medications:     multivitamin (THERAGRAN) TABS, Take by mouth, Disp: , Rfl:     cephalexin (KEFLEX) 500 mg capsule, Take 500 mg by mouth every 6 (six) hours (Patient not taking: No sig reported), Disp: , Rfl:     fluticasone (FLONASE) 50 mcg/act nasal spray, 2 sprays into each nostril daily, Disp: 11.1 mL, Rfl: 1    predniSONE 10 mg tablet, Take 10 mg by mouth 2 (two) times a day (Patient not taking: Reported on 10/24/2022), Disp: , Rfl:     No Known Allergies    Objective:  Vitals:    05/24/24 0854    BP: 158/86   Pulse: (!) 110       Body mass index is 27.29 kg/m².    Right Knee Exam     Tenderness   The patient is experiencing tenderness in the patella.    Range of Motion   Extension:  0   Flexion:  130     Tests   Shaq:  Medial - negative Lateral - negative  Varus: negative Valgus: negative  Drawer:  Anterior - negative    Posterior - negative    Other   Erythema: absent  Scars: absent  Sensation: normal  Pulse: present  Swelling: none  Effusion: no effusion present    Comments:  Stable at 0, 30 and 90 degrees  Neurovascularly in tact distally  No warmth or erythema  Parapatellar crepitance noted  Patellofemoral grind: equivocal      Left Knee Exam     Tenderness   The patient is experiencing no tenderness.     Range of Motion   Extension:  0   Flexion:  130     Tests   Shaq:  Medial - negative Lateral - negative  Varus: negative Valgus: negative  Drawer:  Anterior - negative     Posterior - negative    Other   Erythema: absent  Scars: absent  Sensation: normal  Pulse: present  Swelling: none  Effusion: no effusion present    Comments:  Stable at 0, 30 and 90 degrees  Neurovascularly in tact distally  No warmth or erythema  Parapatellar crepitance noted  Patellofemoral grind: negative          Observations   Left Knee   Negative for effusion.     Right Knee   Negative for effusion.       Physical Exam  Vitals and nursing note reviewed.   Constitutional:       Appearance: Normal appearance. She is well-developed.   HENT:      Head: Normocephalic and atraumatic.      Right Ear: External ear normal.      Left Ear: External ear normal.   Eyes:      General: No scleral icterus.     Extraocular Movements: Extraocular movements intact.      Conjunctiva/sclera: Conjunctivae normal.   Cardiovascular:      Rate and Rhythm: Normal rate.   Pulmonary:      Effort: Pulmonary effort is normal. No respiratory distress.   Musculoskeletal:      Cervical back: Normal range of motion and neck supple.      Right knee: No  effusion.      Instability Tests: Medial Shaq test negative and lateral Shaq test negative.      Left knee: No effusion.      Instability Tests: Medial Shaq test negative and lateral Shaq test negative.      Comments: See Ortho exam   Skin:     General: Skin is warm and dry.   Neurological:      General: No focal deficit present.      Mental Status: She is alert and oriented to person, place, and time.   Psychiatric:         Behavior: Behavior normal.         I have personally reviewed pertinent films in PACS.    Bilateral knee x-rays obtained on 5/24/2024 reviewed demonstrating mild degenerative change in a valgus pattern with lateral narrowing. There is mild lateral patellar tilt. There is sclerosis and osteophytosis.  There is no acute fracture, dislocation, lytic or blastic lesion.    This document was created using speech voice recognition software.   Grammatical errors, random word insertions, pronoun errors, and incomplete sentences are an occasional consequence of this system due to software limitations, ambient noise, and hardware issues.   Any formal questions or concerns about content, text, or information contained within the body of this dictation should be directly addressed to the provider for clarification.

## 2024-05-31 ENCOUNTER — TELEPHONE (OUTPATIENT)
Dept: PHYSICAL THERAPY | Facility: CLINIC | Age: 64
End: 2024-05-31

## 2024-05-31 NOTE — TELEPHONE ENCOUNTER
Pt called to cancel her PT appts. She's feeling better and is too busy right now. She will call back if she wishes to begin PT

## 2024-09-13 ENCOUNTER — OFFICE VISIT (OUTPATIENT)
Dept: URGENT CARE | Facility: CLINIC | Age: 64
End: 2024-09-13
Payer: COMMERCIAL

## 2024-09-13 ENCOUNTER — APPOINTMENT (OUTPATIENT)
Dept: RADIOLOGY | Facility: CLINIC | Age: 64
End: 2024-09-13
Payer: COMMERCIAL

## 2024-09-13 VITALS
RESPIRATION RATE: 18 BRPM | TEMPERATURE: 96.1 F | HEIGHT: 62 IN | BODY MASS INDEX: 28.19 KG/M2 | OXYGEN SATURATION: 98 % | DIASTOLIC BLOOD PRESSURE: 60 MMHG | HEART RATE: 83 BPM | SYSTOLIC BLOOD PRESSURE: 102 MMHG | WEIGHT: 153.2 LBS

## 2024-09-13 DIAGNOSIS — S89.91XA KNEE INJURY, RIGHT, INITIAL ENCOUNTER: Primary | ICD-10-CM

## 2024-09-13 DIAGNOSIS — S89.91XA KNEE INJURY, RIGHT, INITIAL ENCOUNTER: ICD-10-CM

## 2024-09-13 PROCEDURE — 73564 X-RAY EXAM KNEE 4 OR MORE: CPT

## 2024-09-13 PROCEDURE — 99213 OFFICE O/P EST LOW 20 MIN: CPT | Performed by: FAMILY MEDICINE

## 2024-09-13 NOTE — PROGRESS NOTES
"  [facility]        NAME: Trish Gonzalez is a 64 y.o. female  : 1960    MRN: [id number]  DATE: 2024  TIME: 10:09 AM    Assessment and Plan   Knee injury, right, initial encounter [S89.91XA]  1. Knee injury, right, initial encounter  XR knee 4+ vw right injury        Outside of mild arthritis, unremarkable knee x-ray; official read pending.  Prior pain from a few months ago was likely secondary to patellofemoral syndrome which resolved with strengthening of the quad muscles.      Today's evaluation mostly unremarkable, but may be experiencing some strain of various ligaments in the right knee.  Patient advised on using a knee sleeve to help with stability for about 1 week.  May continue with Motrin and icing as needed.    Patient Instructions     Follow up with PCP in 3-5 days.  Proceed to  ER if symptoms worsen.    If tests have been performed at [facility], our office will contact you with results if changes need to be made to the care plan discussed with you at the visit.  You can review your full results on [facility] MyChart.    Chief Complaint     Chief Complaint   Patient presents with    Knee Injury     Fell in store 5 days ago - slipped in water on floor. Fell onto R knee. Continues with diffuse R knee \"achiness\". No swelling/bruising, Used ice and Motrin prn.          History of Present Illness       64-year-old female with pertinent history of osteoarthritis presents today due to direct trauma of the right knee from a fall 5 days ago.  Was in [employer] when she slipped on some liquid on the floor causing her to fall onto her right knee.  Required assistance in getting back up.  Ice to later that evening.  Has been taking Motrin since, but presents today due to persistence of discomfort.      Review of Systems   Review of Systems   Constitutional:  Negative for chills and fever.   Respiratory:  Negative for cough and shortness of breath.    Cardiovascular:  Negative for chest pain. " "  Gastrointestinal:  Negative for abdominal pain.   Musculoskeletal:  Positive for arthralgias.   Neurological:  Negative for dizziness, weakness and headaches.     Current Medications       Current Outpatient Medications:     Multiple Vitamins-Minerals (PRESERVISION AREDS PO), Take by mouth Daily at 2am, Disp: , Rfl:     multivitamin (THERAGRAN) TABS, Take by mouth, Disp: , Rfl:     Current Allergies     Allergies as of 09/13/2024    (No Known Allergies)            The following portions of the patient's history were reviewed and updated as appropriate: allergies, current medications, past family history, past medical history, past social history, past surgical history and problem list.     Past Medical History:   Diagnosis Date    Cancer (HCC)     L breast cancer    Sleep difficulties        Past Surgical History:   Procedure Laterality Date    ADENOIDECTOMY      BREAST SURGERY Left     MRM    TONSILLECTOMY         No family history on file.      Medications have been verified.        Objective   /60   Pulse 83   Temp (!) 96.1 °F (35.6 °C)   Resp 18   Ht 5' 2\" (1.575 m)   Wt 69.5 kg (153 lb 3.2 oz)   SpO2 98%   BMI 28.02 kg/m²   No LMP recorded. Patient has had a hysterectomy.       Physical Exam     Physical Exam  Vitals and nursing note reviewed.   Constitutional:       General: She is in acute distress.      Appearance: Normal appearance. She is normal weight. She is not ill-appearing, toxic-appearing or diaphoretic.   HENT:      Head: Normocephalic and atraumatic.   Eyes:      General:         Right eye: No discharge.         Left eye: No discharge.      Conjunctiva/sclera: Conjunctivae normal.   Pulmonary:      Effort: Pulmonary effort is normal.   Musculoskeletal:         General: No swelling, tenderness, deformity or signs of injury.   Skin:     General: Skin is warm.      Findings: No erythema.   Neurological:      General: No focal deficit present.      Mental Status: She is alert and " oriented to person, place, and time.   Psychiatric:         Mood and Affect: Mood normal.         Behavior: Behavior normal.         Thought Content: Thought content normal.         Judgment: Judgment normal.